# Patient Record
Sex: MALE | Race: WHITE | NOT HISPANIC OR LATINO | Employment: FULL TIME | URBAN - METROPOLITAN AREA
[De-identification: names, ages, dates, MRNs, and addresses within clinical notes are randomized per-mention and may not be internally consistent; named-entity substitution may affect disease eponyms.]

---

## 2021-05-06 ENCOUNTER — APPOINTMENT (OUTPATIENT)
Dept: LAB | Facility: HOSPITAL | Age: 41
End: 2021-05-06
Attending: INTERNAL MEDICINE
Payer: COMMERCIAL

## 2021-05-06 ENCOUNTER — OFFICE VISIT (OUTPATIENT)
Dept: INTERNAL MEDICINE CLINIC | Facility: CLINIC | Age: 41
End: 2021-05-06
Payer: COMMERCIAL

## 2021-05-06 VITALS
TEMPERATURE: 98 F | DIASTOLIC BLOOD PRESSURE: 82 MMHG | SYSTOLIC BLOOD PRESSURE: 120 MMHG | WEIGHT: 245 LBS | RESPIRATION RATE: 16 BRPM | HEART RATE: 78 BPM | HEIGHT: 75 IN | BODY MASS INDEX: 30.46 KG/M2 | OXYGEN SATURATION: 96 %

## 2021-05-06 DIAGNOSIS — Z13.6 ENCOUNTER FOR SCREENING FOR CARDIOVASCULAR DISORDERS: Primary | ICD-10-CM

## 2021-05-06 DIAGNOSIS — Z13.6 ENCOUNTER FOR SCREENING FOR CARDIOVASCULAR DISORDERS: ICD-10-CM

## 2021-05-06 DIAGNOSIS — J30.2 SEASONAL ALLERGIES: ICD-10-CM

## 2021-05-06 LAB
ALBUMIN SERPL BCP-MCNC: 4 G/DL (ref 3.5–5)
ALP SERPL-CCNC: 49 U/L (ref 46–116)
ALT SERPL W P-5'-P-CCNC: 51 U/L (ref 12–78)
ANION GAP SERPL CALCULATED.3IONS-SCNC: 7 MMOL/L (ref 4–13)
AST SERPL W P-5'-P-CCNC: 34 U/L (ref 5–45)
BASOPHILS # BLD AUTO: 0.04 THOUSANDS/ΜL (ref 0–0.1)
BASOPHILS NFR BLD AUTO: 1 % (ref 0–1)
BILIRUB SERPL-MCNC: 0.47 MG/DL (ref 0.2–1)
BUN SERPL-MCNC: 18 MG/DL (ref 5–25)
CALCIUM SERPL-MCNC: 8.5 MG/DL (ref 8.3–10.1)
CHLORIDE SERPL-SCNC: 104 MMOL/L (ref 100–108)
CHOLEST SERPL-MCNC: 170 MG/DL (ref 50–200)
CO2 SERPL-SCNC: 29 MMOL/L (ref 21–32)
CREAT SERPL-MCNC: 0.91 MG/DL (ref 0.6–1.3)
EOSINOPHIL # BLD AUTO: 0.19 THOUSAND/ΜL (ref 0–0.61)
EOSINOPHIL NFR BLD AUTO: 4 % (ref 0–6)
ERYTHROCYTE [DISTWIDTH] IN BLOOD BY AUTOMATED COUNT: 12.6 % (ref 11.6–15.1)
GFR SERPL CREATININE-BSD FRML MDRD: 107 ML/MIN/1.73SQ M
GLUCOSE P FAST SERPL-MCNC: 94 MG/DL (ref 65–99)
HCT VFR BLD AUTO: 48.6 % (ref 36.5–49.3)
HDLC SERPL-MCNC: 47 MG/DL
HGB BLD-MCNC: 15.3 G/DL (ref 12–17)
IMM GRANULOCYTES # BLD AUTO: 0.01 THOUSAND/UL (ref 0–0.2)
IMM GRANULOCYTES NFR BLD AUTO: 0 % (ref 0–2)
LDLC SERPL CALC-MCNC: 116 MG/DL (ref 0–100)
LYMPHOCYTES # BLD AUTO: 1.77 THOUSANDS/ΜL (ref 0.6–4.47)
LYMPHOCYTES NFR BLD AUTO: 35 % (ref 14–44)
MCH RBC QN AUTO: 27 PG (ref 26.8–34.3)
MCHC RBC AUTO-ENTMCNC: 31.5 G/DL (ref 31.4–37.4)
MCV RBC AUTO: 86 FL (ref 82–98)
MONOCYTES # BLD AUTO: 0.42 THOUSAND/ΜL (ref 0.17–1.22)
MONOCYTES NFR BLD AUTO: 8 % (ref 4–12)
NEUTROPHILS # BLD AUTO: 2.66 THOUSANDS/ΜL (ref 1.85–7.62)
NEUTS SEG NFR BLD AUTO: 52 % (ref 43–75)
NONHDLC SERPL-MCNC: 123 MG/DL
NRBC BLD AUTO-RTO: 0 /100 WBCS
PLATELET # BLD AUTO: 216 THOUSANDS/UL (ref 149–390)
PMV BLD AUTO: 10.3 FL (ref 8.9–12.7)
POTASSIUM SERPL-SCNC: 4.6 MMOL/L (ref 3.5–5.3)
PROT SERPL-MCNC: 7.1 G/DL (ref 6.4–8.2)
RBC # BLD AUTO: 5.66 MILLION/UL (ref 3.88–5.62)
SODIUM SERPL-SCNC: 140 MMOL/L (ref 136–145)
TRIGL SERPL-MCNC: 37 MG/DL
WBC # BLD AUTO: 5.09 THOUSAND/UL (ref 4.31–10.16)

## 2021-05-06 PROCEDURE — 3008F BODY MASS INDEX DOCD: CPT | Performed by: INTERNAL MEDICINE

## 2021-05-06 PROCEDURE — 80053 COMPREHEN METABOLIC PANEL: CPT

## 2021-05-06 PROCEDURE — 36415 COLL VENOUS BLD VENIPUNCTURE: CPT

## 2021-05-06 PROCEDURE — 99202 OFFICE O/P NEW SF 15 MIN: CPT | Performed by: INTERNAL MEDICINE

## 2021-05-06 PROCEDURE — 80061 LIPID PANEL: CPT

## 2021-05-06 PROCEDURE — 85025 COMPLETE CBC W/AUTO DIFF WBC: CPT

## 2021-05-06 PROCEDURE — 1036F TOBACCO NON-USER: CPT | Performed by: INTERNAL MEDICINE

## 2021-05-06 PROCEDURE — 3725F SCREEN DEPRESSION PERFORMED: CPT | Performed by: INTERNAL MEDICINE

## 2021-05-06 NOTE — PROGRESS NOTES
BMI Counseling: Body mass index is 30 62 kg/m²  The BMI is above normal  Nutrition recommendations include encouraging healthy choices of fruits and vegetables and limiting drinks that contain sugar  Exercise recommendations include moderate physical activity 150 minutes/week  No pharmacotherapy was ordered  Depression Screening and Follow-up Plan: Patient's depression screening was positive with a PHQ-2 score of 0  Clincally patient does not have depression  No treatment is required  Assessment/Plan:    Healthy 47year old male  F/u with me in 1 year and prn  Labs ordered  No problem-specific Assessment & Plan notes found for this encounter  Diagnoses and all orders for this visit:    Encounter for screening for cardiovascular disorders  -     CBC and differential; Future  -     Lipid panel; Future  -     Comprehensive metabolic panel; Future    Seasonal allergies          Subjective:      Patient ID: Evon Cobb is a 47 y o  male  Patient is a very pleasant 47year old male presenting to the clinic to establish care as a new patient  No significant PMH  He is  and has one son who is 16years old  He is a  and recently has his DOT physical done  He denies any complaints today  Denies any cp, sob, n/v/d/c, fever, chills, abdominal pain, bloody stools  He is active  And he is very disciplined with his diet  Does report seasonal allergies  Takes an allegra daily  Uses Flonase  The following portions of the patient's history were reviewed and updated as appropriate:   He  has no past medical history on file  He There are no active problems to display for this patient  He  has a past surgical history that includes Tonsillectomy and adenoidectomy  His family history includes Diabetes in his father; Heart disease in his father; Other in his brother  He  reports that he has never smoked  He has never used smokeless tobacco  He reports previous alcohol use   He reports that he does not use drugs  No current outpatient medications on file  No current facility-administered medications for this visit  No current outpatient medications on file prior to visit  No current facility-administered medications on file prior to visit  He has No Known Allergies       Review of Systems   Constitutional: Negative for fatigue, fever and unexpected weight change  HENT: Negative for tinnitus, trouble swallowing and voice change  Cardiovascular: Negative for chest pain, palpitations and leg swelling  Gastrointestinal: Negative for abdominal distention, abdominal pain, anal bleeding, blood in stool, constipation, diarrhea and vomiting  Genitourinary: Negative for decreased urine volume, scrotal swelling, testicular pain and urgency  Musculoskeletal: Negative for arthralgias, back pain and gait problem  Neurological: Negative for tremors, syncope, speech difficulty and weakness  Psychiatric/Behavioral: Negative for dysphoric mood and hallucinations  The patient is not nervous/anxious and is not hyperactive  All other systems reviewed and are negative  Objective:      /82 (BP Location: Left arm, Patient Position: Sitting, Cuff Size: Standard)   Pulse 78   Temp 98 °F (36 7 °C) (Tympanic)   Resp 16   Ht 6' 3" (1 905 m)   Wt 111 kg (245 lb)   SpO2 96%   BMI 30 62 kg/m²          Physical Exam  Vitals signs and nursing note reviewed  Constitutional:       Appearance: Normal appearance  He is overweight  HENT:      Head: Normocephalic and atraumatic  Right Ear: Tympanic membrane normal       Left Ear: Tympanic membrane normal       Nose: Nose normal    Neck:      Musculoskeletal: Normal range of motion and neck supple  Vascular: No carotid bruit  Cardiovascular:      Rate and Rhythm: Normal rate and regular rhythm  Heart sounds: Normal heart sounds     Pulmonary:      Effort: Pulmonary effort is normal       Breath sounds: Normal breath sounds  Abdominal:      General: Abdomen is flat  Bowel sounds are normal       Palpations: Abdomen is soft  Musculoskeletal: Normal range of motion  Right lower leg: No edema  Left lower leg: No edema  Lymphadenopathy:      Cervical: No cervical adenopathy  Skin:     General: Skin is warm and dry  Neurological:      General: No focal deficit present  Mental Status: He is alert and oriented to person, place, and time     Psychiatric:         Mood and Affect: Mood normal          Behavior: Behavior normal

## 2021-05-10 ENCOUNTER — TELEPHONE (OUTPATIENT)
Dept: INTERNAL MEDICINE CLINIC | Facility: CLINIC | Age: 41
End: 2021-05-10

## 2021-05-10 NOTE — TELEPHONE ENCOUNTER
----- Message from Phil Weber MD sent at 5/6/2021 12:40 PM EDT -----  Samuel's labs look great  His cholesterol is a little high, it is 117 and we like it under 100  Can become more active and try to better his diet  He does very well and avoids sugary products  Cut down on white foods like pasta and rice     ----- Message -----  From: Lab, Background User  Sent: 5/6/2021  10:38 AM EDT  To: Phil Weber MD

## 2021-11-04 ENCOUNTER — TELEMEDICINE (OUTPATIENT)
Dept: INTERNAL MEDICINE CLINIC | Facility: CLINIC | Age: 41
End: 2021-11-04
Payer: COMMERCIAL

## 2021-11-04 VITALS — BODY MASS INDEX: 30.46 KG/M2 | HEIGHT: 75 IN | WEIGHT: 245 LBS

## 2021-11-04 DIAGNOSIS — R68.83 CHILLS (WITHOUT FEVER): ICD-10-CM

## 2021-11-04 DIAGNOSIS — J06.9 VIRAL UPPER RESPIRATORY TRACT INFECTION: Primary | ICD-10-CM

## 2021-11-04 PROCEDURE — 99214 OFFICE O/P EST MOD 30 MIN: CPT | Performed by: INTERNAL MEDICINE

## 2021-11-04 PROCEDURE — U0003 INFECTIOUS AGENT DETECTION BY NUCLEIC ACID (DNA OR RNA); SEVERE ACUTE RESPIRATORY SYNDROME CORONAVIRUS 2 (SARS-COV-2) (CORONAVIRUS DISEASE [COVID-19]), AMPLIFIED PROBE TECHNIQUE, MAKING USE OF HIGH THROUGHPUT TECHNOLOGIES AS DESCRIBED BY CMS-2020-01-R: HCPCS | Performed by: INTERNAL MEDICINE

## 2021-11-04 PROCEDURE — U0005 INFEC AGEN DETEC AMPLI PROBE: HCPCS | Performed by: INTERNAL MEDICINE

## 2021-11-05 LAB — SARS-COV-2 RNA RESP QL NAA+PROBE: POSITIVE

## 2021-11-08 ENCOUNTER — TELEMEDICINE (OUTPATIENT)
Dept: INTERNAL MEDICINE CLINIC | Facility: CLINIC | Age: 41
End: 2021-11-08
Payer: COMMERCIAL

## 2021-11-08 VITALS — BODY MASS INDEX: 30.46 KG/M2 | WEIGHT: 245 LBS | HEIGHT: 75 IN

## 2021-11-08 DIAGNOSIS — U07.1 COVID-19: Primary | ICD-10-CM

## 2021-11-08 PROCEDURE — 3008F BODY MASS INDEX DOCD: CPT | Performed by: INTERNAL MEDICINE

## 2021-11-08 PROCEDURE — 3725F SCREEN DEPRESSION PERFORMED: CPT | Performed by: INTERNAL MEDICINE

## 2021-11-08 PROCEDURE — 1036F TOBACCO NON-USER: CPT | Performed by: INTERNAL MEDICINE

## 2021-11-08 PROCEDURE — 99214 OFFICE O/P EST MOD 30 MIN: CPT | Performed by: INTERNAL MEDICINE

## 2022-11-03 ENCOUNTER — APPOINTMENT (OUTPATIENT)
Dept: LAB | Facility: HOSPITAL | Age: 42
End: 2022-11-03

## 2022-11-03 ENCOUNTER — OFFICE VISIT (OUTPATIENT)
Dept: INTERNAL MEDICINE CLINIC | Facility: CLINIC | Age: 42
End: 2022-11-03

## 2022-11-03 VITALS
OXYGEN SATURATION: 99 % | HEART RATE: 72 BPM | BODY MASS INDEX: 32.95 KG/M2 | HEIGHT: 75 IN | WEIGHT: 265 LBS | TEMPERATURE: 98 F | SYSTOLIC BLOOD PRESSURE: 124 MMHG | DIASTOLIC BLOOD PRESSURE: 78 MMHG

## 2022-11-03 DIAGNOSIS — J30.2 SEASONAL ALLERGIES: ICD-10-CM

## 2022-11-03 DIAGNOSIS — Z00.00 ANNUAL PHYSICAL EXAM: Primary | ICD-10-CM

## 2022-11-03 DIAGNOSIS — Z13.220 ENCOUNTER FOR LIPID SCREENING FOR CARDIOVASCULAR DISEASE: ICD-10-CM

## 2022-11-03 DIAGNOSIS — Z11.59 ENCOUNTER FOR HEPATITIS C SCREENING TEST FOR LOW RISK PATIENT: ICD-10-CM

## 2022-11-03 DIAGNOSIS — Z82.49 FAMILY HISTORY OF CARDIAC DISORDER IN FATHER: ICD-10-CM

## 2022-11-03 DIAGNOSIS — Z11.4 SCREENING FOR HIV WITHOUT PRESENCE OF RISK FACTORS: ICD-10-CM

## 2022-11-03 DIAGNOSIS — Z13.6 ENCOUNTER FOR LIPID SCREENING FOR CARDIOVASCULAR DISEASE: ICD-10-CM

## 2022-11-03 LAB
ALBUMIN SERPL BCP-MCNC: 4.1 G/DL (ref 3.5–5)
ALP SERPL-CCNC: 48 U/L (ref 46–116)
ALT SERPL W P-5'-P-CCNC: 60 U/L (ref 12–78)
ANION GAP SERPL CALCULATED.3IONS-SCNC: 1 MMOL/L (ref 4–13)
AST SERPL W P-5'-P-CCNC: 39 U/L (ref 5–45)
BASOPHILS # BLD AUTO: 0.03 THOUSANDS/ÂΜL (ref 0–0.1)
BASOPHILS NFR BLD AUTO: 1 % (ref 0–1)
BILIRUB SERPL-MCNC: 0.56 MG/DL (ref 0.2–1)
BUN SERPL-MCNC: 22 MG/DL (ref 5–25)
CALCIUM SERPL-MCNC: 9.3 MG/DL (ref 8.3–10.1)
CHLORIDE SERPL-SCNC: 107 MMOL/L (ref 96–108)
CHOLEST SERPL-MCNC: 168 MG/DL
CO2 SERPL-SCNC: 31 MMOL/L (ref 21–32)
CREAT SERPL-MCNC: 0.9 MG/DL (ref 0.6–1.3)
EOSINOPHIL # BLD AUTO: 0.2 THOUSAND/ÂΜL (ref 0–0.61)
EOSINOPHIL NFR BLD AUTO: 4 % (ref 0–6)
ERYTHROCYTE [DISTWIDTH] IN BLOOD BY AUTOMATED COUNT: 12.9 % (ref 11.6–15.1)
EST. AVERAGE GLUCOSE BLD GHB EST-MCNC: 120 MG/DL
GFR SERPL CREATININE-BSD FRML MDRD: 106 ML/MIN/1.73SQ M
GLUCOSE P FAST SERPL-MCNC: 97 MG/DL (ref 65–99)
HBA1C MFR BLD: 5.8 %
HCT VFR BLD AUTO: 47.3 % (ref 36.5–49.3)
HCV AB SER QL: NORMAL
HDLC SERPL-MCNC: 48 MG/DL
HGB BLD-MCNC: 14.8 G/DL (ref 12–17)
IMM GRANULOCYTES # BLD AUTO: 0.01 THOUSAND/UL (ref 0–0.2)
IMM GRANULOCYTES NFR BLD AUTO: 0 % (ref 0–2)
LDLC SERPL CALC-MCNC: 109 MG/DL (ref 0–100)
LYMPHOCYTES # BLD AUTO: 1.81 THOUSANDS/ÂΜL (ref 0.6–4.47)
LYMPHOCYTES NFR BLD AUTO: 38 % (ref 14–44)
MCH RBC QN AUTO: 27.1 PG (ref 26.8–34.3)
MCHC RBC AUTO-ENTMCNC: 31.3 G/DL (ref 31.4–37.4)
MCV RBC AUTO: 87 FL (ref 82–98)
MONOCYTES # BLD AUTO: 0.49 THOUSAND/ÂΜL (ref 0.17–1.22)
MONOCYTES NFR BLD AUTO: 10 % (ref 4–12)
NEUTROPHILS # BLD AUTO: 2.18 THOUSANDS/ÂΜL (ref 1.85–7.62)
NEUTS SEG NFR BLD AUTO: 47 % (ref 43–75)
NRBC BLD AUTO-RTO: 0 /100 WBCS
PLATELET # BLD AUTO: 209 THOUSANDS/UL (ref 149–390)
PMV BLD AUTO: 10.4 FL (ref 8.9–12.7)
POTASSIUM SERPL-SCNC: 4.5 MMOL/L (ref 3.5–5.3)
PROT SERPL-MCNC: 7.1 G/DL (ref 6.4–8.4)
RBC # BLD AUTO: 5.47 MILLION/UL (ref 3.88–5.62)
SODIUM SERPL-SCNC: 139 MMOL/L (ref 135–147)
TRIGL SERPL-MCNC: 56 MG/DL
WBC # BLD AUTO: 4.72 THOUSAND/UL (ref 4.31–10.16)

## 2022-11-03 NOTE — PROGRESS NOTES
Assessment/Plan:     Patient is here for annual physical  General exam is good  He would like to get blood work done  Denies any major complaints  Is  and has a family  Discussed helathy drinking habits  Denies any drug use  There is a family h/o heart disease in his father who passed away at the age of 62  Follow up yearly and prn  Quality Measures:     BMI Counseling: Body mass index is 33 12 kg/m²  The BMI is above normal  Nutrition recommendations include decreasing portion sizes and encouraging healthy choices of fruits and vegetables  Exercise recommendations include moderate physical activity 150 minutes/week  Rationale for BMI follow-up plan is due to patient being overweight or obese  Depression Screening and Follow-up Plan: Patient was screened for depression during today's encounter  They screened negative with a PHQ-2 score of 0  Return in about 1 year (around 11/3/2023)  No problem-specific Assessment & Plan notes found for this encounter  Diagnoses and all orders for this visit:    Annual physical exam    Seasonal allergies  -     CBC and differential; Future  -     Comprehensive metabolic panel; Future    Encounter for hepatitis C screening test for low risk patient  -     Hepatitis C antibody; Future    Screening for HIV without presence of risk factors  -     HIV 1/2 Antigen/Antibody (4th Generation) w Reflex [facility]; Future    Family history of cardiac disorder in father  -     CBC and differential; Future  -     Comprehensive metabolic panel; Future  -     Lipid Panel with Direct LDL reflex; Future  -     Hemoglobin A1C; Future    Encounter for lipid screening for cardiovascular disease  -     Lipid Panel with Direct LDL reflex; Future  -     Hemoglobin A1C; Future          Subjective:      Patient ID: Bird Bland is a 38 y o  male      Here for physical       ALLERGIES:  No Known Allergies    CURRENT MEDICATIONS:  No current outpatient medications on file     ACTIVE PROBLEM LIST:  Patient Active Problem List   Diagnosis   • Annual physical exam   • Seasonal allergies       PAST MEDICAL HISTORY:  History reviewed  No pertinent past medical history  PAST SURGICAL HISTORY:  Past Surgical History:   Procedure Laterality Date   • TONSILLECTOMY AND ADENOIDECTOMY         FAMILY HISTORY:  Family History   Problem Relation Age of Onset   • Heart disease Father    • Diabetes Father    • Other Brother         history of non-Hodgkins lymphoma       SOCIAL HISTORY:  Social History     Socioeconomic History   • Marital status: /Civil Union     Spouse name: Not on file   • Number of children: Not on file   • Years of education: 12   • Highest education level: Not on file   Occupational History   • Occupation:    Tobacco Use   • Smoking status: Never Smoker   • Smokeless tobacco: Never Used   Substance and Sexual Activity   • Alcohol use: Not Currently   • Drug use: Never   • Sexual activity: Yes   Other Topics Concern   • Not on file   Social History Narrative    · Do you currently or have you served in the [email or web address]:   No      · Were you activated, into active duty, as a member of the [employer] or as a Reservist:   No      · Exercise level: Moderate      · Diet:   Regular      · General stress level:   Low      · Caffeine intake: Moderate      · Guns present in home:   No      · Seat belts used routinely:   Yes      · Sunscreen used routinely:   Yes      · Smoke alarm in home:    Yes      · Advance directive:   No      · Salt Intake:   occasional      · Has the Patient had a mammogram to screen for breast cancer within 24 months:   No      · Would the patient like to schedule a Mammogram:   No      · Is the patient interested in a colorectal cancer screening:   No      ·      Social Determinants of Health     Financial Resource Strain: Not on file   Food Insecurity: Not on file   Transportation Needs: Not on file   Physical Activity: Not on file   Stress: Not on file   Social Connections: Not on file   Intimate Partner Violence: Not on file   Housing Stability: Not on file       Review of Systems   All other systems reviewed and are negative  Objective:  Vitals:    11/03/22 0944   BP: 124/78   BP Location: Left arm   Patient Position: Sitting   Cuff Size: Adult   Pulse: 72   Temp: 98 °F (36 7 °C)   TempSrc: Tympanic   SpO2: 99%   Weight: 120 kg (265 lb)   Height: 6' 3" (1 905 m)     Body mass index is 33 12 kg/m²  Physical Exam  Vitals and nursing note reviewed  Constitutional:       Appearance: Normal appearance  HENT:      Head: Normocephalic and atraumatic  Right Ear: Tympanic membrane normal       Left Ear: Tympanic membrane normal    Cardiovascular:      Rate and Rhythm: Normal rate and regular rhythm  Heart sounds: Normal heart sounds  Pulmonary:      Breath sounds: Normal breath sounds  Musculoskeletal:      Cervical back: Normal range of motion  Lymphadenopathy:      Cervical: No cervical adenopathy  Neurological:      Mental Status: He is alert  RESULTS:    No results found for this or any previous visit (from the past 1008 hour(s))  This note was created with voice recognition software  Phonic, grammatical and spelling errors may be present within the note as a result

## 2022-11-03 NOTE — PROGRESS NOTES
Assessment/Plan:      Quality Measures:     BMI Counseling: There is no height or weight on file to calculate BMI  The BMI is above normal  Nutrition recommendations include decreasing portion sizes and encouraging healthy choices of fruits and vegetables  Exercise recommendations include moderate physical activity 150 minutes/week  Rationale for BMI follow-up plan is due to patient being overweight or obese  Depression Screening and Follow-up Plan: Clincally patient does not have depression  No treatment is required  No follow-ups on file  No problem-specific Assessment & Plan notes found for this encounter  There are no diagnoses linked to this encounter  Subjective:      Patient ID: Gi Wilson is a 38 y o  male  Here for routine visit  ALLERGIES:  No Known Allergies    CURRENT MEDICATIONS:  No current outpatient medications on file  ACTIVE PROBLEM LIST:  There is no problem list on file for this patient  PAST MEDICAL HISTORY:  No past medical history on file      PAST SURGICAL HISTORY:  Past Surgical History:   Procedure Laterality Date   • TONSILLECTOMY AND ADENOIDECTOMY         FAMILY HISTORY:  Family History   Problem Relation Age of Onset   • Heart disease Father    • Diabetes Father    • Other Brother         history of non-Hodgkins lymphoma       SOCIAL HISTORY:  Social History     Socioeconomic History   • Marital status: /Civil Union     Spouse name: Not on file   • Number of children: Not on file   • Years of education: 12   • Highest education level: Not on file   Occupational History   • Occupation:    Tobacco Use   • Smoking status: Never Smoker   • Smokeless tobacco: Never Used   Substance and Sexual Activity   • Alcohol use: Not Currently   • Drug use: Never   • Sexual activity: Yes   Other Topics Concern   • Not on file   Social History Narrative    · Do you currently or have you served in the [employer]:   No      · Were you activated, into active duty, as a member of the [employer] or as a Reservist:   No      · Exercise level: Moderate      · Diet:   Regular      · General stress level:   Low      · Caffeine intake: Moderate      · Guns present in home:   No      · Seat belts used routinely:   Yes      · Sunscreen used routinely:   Yes      · Smoke alarm in home: Yes      · Advance directive:   No      · Salt Intake:   occasional      · Has the Patient had a mammogram to screen for breast cancer within 24 months:   No      · Would the patient like to schedule a Mammogram:   No      · Is the patient interested in a colorectal cancer screening:   No      ·      Social Determinants of Health     Financial Resource Strain: Not on file   Food Insecurity: Not on file   Transportation Needs: Not on file   Physical Activity: Not on file   Stress: Not on file   Social Connections: Not on file   Intimate Partner Violence: Not on file   Housing Stability: Not on file       Review of Systems   All other systems reviewed and are negative  Objective: There were no vitals filed for this visit  There is no height or weight on file to calculate BMI  Physical Exam  Vitals and nursing note reviewed  Constitutional:       Appearance: Normal appearance  HENT:      Head: Normocephalic and atraumatic  Cardiovascular:      Rate and Rhythm: Normal rate and regular rhythm  Heart sounds: Normal heart sounds  Pulmonary:      Effort: Pulmonary effort is normal       Breath sounds: Normal breath sounds  Musculoskeletal:         General: Normal range of motion  Cervical back: Normal range of motion  Right lower leg: No edema  Left lower leg: No edema  Skin:     General: Skin is warm and dry  Neurological:      General: No focal deficit present  Mental Status: He is alert and oriented to person, place, and time  Mental status is at baseline     Psychiatric:         Mood and Affect: Mood normal  RESULTS:    No results found for this or any previous visit (from the past 1008 hour(s))  This note was created with voice recognition software  Phonic, grammatical and spelling errors may be present within the note as a result

## 2022-11-03 NOTE — PROGRESS NOTES
[address]    NAME: Ting Brito  AGE: 38 y o  SEX: male  : 1980     DATE: 11/3/2022     Assessment and Plan:     Problem List Items Addressed This Visit    None     Visit Diagnoses     Seasonal allergies    -  Primary    Relevant Orders    CBC and differential    Comprehensive metabolic panel    Encounter for hepatitis C screening test for low risk patient        Relevant Orders    Hepatitis C antibody    Screening for HIV without presence of risk factors        Relevant Orders    HIV 1/2 Antigen/Antibody (4th Generation) w Reflex [facility]    Family history of cardiac disorder in father        Relevant Orders    CBC and differential    Comprehensive metabolic panel    Lipid Panel with Direct LDL reflex    Hemoglobin A1C    Annual physical exam        Encounter for lipid screening for cardiovascular disease        Relevant Orders    Lipid Panel with Direct LDL reflex    Hemoglobin A1C          Immunizations and preventive care screenings were discussed with patient today  Appropriate education was printed on patient's after visit summary  Counseling:  Alcohol/drug use: discussed moderation in alcohol intake, the recommendations for healthy alcohol use, and avoidance of illicit drug use  Dental Health: discussed importance of regular tooth brushing, flossing, and dental visits  Injury prevention: discussed safety/seat belts, safety helmets, smoke detectors, carbon dioxide detectors, and smoking near bedding or upholstery  Sexual health: discussed sexually transmitted diseases, partner selection, use of condoms, avoidance of unintended pregnancy, and contraceptive alternatives  · Exercise: the importance of regular exercise/physical activity was discussed  Recommend exercise 3-5 times per week for at least 30 minutes  BMI Counseling: Body mass index is 33 12 kg/m²   The BMI is above normal  Nutrition recommendations include decreasing portion sizes and encouraging healthy choices of fruits and vegetables  Exercise recommendations include moderate physical activity 150 minutes/week  Rationale for BMI follow-up plan is due to patient being overweight or obese  Depression Screening and Follow-up Plan: Patient was screened for depression during today's encounter  They screened negative with a PHQ-2 score of 0  No follow-ups on file  Chief Complaint:     Chief Complaint   Patient presents with   • Physical Exam      History of Present Illness:     Adult Annual Physical   Patient here for a comprehensive physical exam  The patient reports no problems  Diet and Physical Activity  · Diet/Nutrition: well balanced diet  · Exercise: no formal exercise  Depression Screening  PHQ-2/9 Depression Screening    Little interest or pleasure in doing things: 0 - not at all  Feeling down, depressed, or hopeless: 0 - not at all  PHQ-2 Score: 0  PHQ-2 Interpretation: Negative depression screen       General Health  · Sleep: sleeps well  · Hearing: normal - bilateral   · Vision: wears glasses  · Dental: regular dental visits   Health  · Symptoms include: none     Review of Systems:     Review of Systems   All other systems reviewed and are negative  Past Medical History:     History reviewed  No pertinent past medical history     Past Surgical History:     Past Surgical History:   Procedure Laterality Date   • TONSILLECTOMY AND ADENOIDECTOMY        Family History:     Family History   Problem Relation Age of Onset   • Heart disease Father    • Diabetes Father    • Other Brother         history of non-Hodgkins lymphoma      Social History:     Social History     Socioeconomic History   • Marital status: /Civil Union     Spouse name: None   • Number of children: None   • Years of education: 12   • Highest education level: None   Occupational History   • Occupation:    Tobacco Use • Smoking status: Never Smoker   • Smokeless tobacco: Never Used   Substance and Sexual Activity   • Alcohol use: Not Currently   • Drug use: Never   • Sexual activity: Yes   Other Topics Concern   • None   Social History Narrative    · Do you currently or have you served in the [employer]:   No      · Were you activated, into active duty, as a member of the [employer] or as a Reservist:   No      · Exercise level: Moderate      · Diet:   Regular      · General stress level:   Low      · Caffeine intake: Moderate      · Guns present in home:   No      · Seat belts used routinely:   Yes      · Sunscreen used routinely:   Yes      · Smoke alarm in home: Yes      · Advance directive:   No      · Salt Intake:   occasional      · Has the Patient had a mammogram to screen for breast cancer within 24 months:   No      · Would the patient like to schedule a Mammogram:   No      · Is the patient interested in a colorectal cancer screening:   No      ·      Social Determinants of Health     Financial Resource Strain: Not on file   Food Insecurity: Not on file   Transportation Needs: Not on file   Physical Activity: Not on file   Stress: Not on file   Social Connections: Not on file   Intimate Partner Violence: Not on file   Housing Stability: Not on file      Current Medications:     No current outpatient medications on file  No current facility-administered medications for this visit  Allergies:     No Known Allergies   Physical Exam:     /78 (BP Location: Left arm, Patient Position: Sitting, Cuff Size: Adult)   Pulse 72   Temp 98 °F (36 7 °C) (Tympanic)   Ht 6' 3" (1 905 m)   Wt 120 kg (265 lb)   SpO2 99%   BMI 33 12 kg/m²     Physical Exam  Vitals and nursing note reviewed  Constitutional:       Appearance: Normal appearance  HENT:      Head: Normocephalic and atraumatic  Cardiovascular:      Rate and Rhythm: Normal rate and regular rhythm  Heart sounds: Normal heart sounds  Pulmonary:      Effort: Pulmonary effort is normal       Breath sounds: Normal breath sounds  Musculoskeletal:         General: Normal range of motion  Cervical back: Normal range of motion  Right lower leg: No edema  Left lower leg: No edema  Skin:     General: Skin is warm and dry  Neurological:      General: No focal deficit present  Mental Status: He is alert and oriented to person, place, and time  Mental status is at baseline     Psychiatric:         Mood and Affect: Mood normal           Leon Olivera MD  [facility]

## 2022-11-04 LAB — HIV 1+2 AB+HIV1 P24 AG SERPL QL IA: NORMAL

## 2023-03-11 ENCOUNTER — APPOINTMENT (EMERGENCY)
Dept: VASCULAR ULTRASOUND | Facility: HOSPITAL | Age: 43
End: 2023-03-11

## 2023-03-11 ENCOUNTER — HOSPITAL ENCOUNTER (EMERGENCY)
Facility: HOSPITAL | Age: 43
Discharge: HOME | End: 2023-03-11
Attending: EMERGENCY MEDICINE | Admitting: EMERGENCY MEDICINE

## 2023-03-11 VITALS
OXYGEN SATURATION: 100 % | TEMPERATURE: 98.3 F | WEIGHT: 265 LBS | RESPIRATION RATE: 17 BRPM | HEART RATE: 83 BPM | BODY MASS INDEX: 32.95 KG/M2 | SYSTOLIC BLOOD PRESSURE: 139 MMHG | DIASTOLIC BLOOD PRESSURE: 90 MMHG | HEIGHT: 75 IN

## 2023-03-11 DIAGNOSIS — M79.604 RIGHT LEG PAIN: Primary | ICD-10-CM

## 2023-03-11 NOTE — ED PROVIDER NOTES
History  Chief Complaint   Patient presents with   • Leg Pain     Pt started with right leg numbness that started today but has resolved but is now feeling tight  Pt is a   No hx of blood clots  Intermittent R medial leg tightness since earlier today  Initially described as a numbness but on further questioning he notes it was more of a sensation of tightness than a lack of sensation  No trauma but pt works as a   No leg swelling or cough or sob or syncope or near syncope  No h/o vte  No leg weakness  None       History reviewed  No pertinent past medical history  Past Surgical History:   Procedure Laterality Date   • TONSILLECTOMY AND ADENOIDECTOMY         Family History   Problem Relation Age of Onset   • Heart disease Father    • Diabetes Father    • Other Brother         history of non-Hodgkins lymphoma     I have reviewed and agree with the history as documented  E-Cigarette/Vaping     E-Cigarette/Vaping Substances     Social History     Tobacco Use   • Smoking status: Never   • Smokeless tobacco: Never   Substance Use Topics   • Alcohol use: Not Currently   • Drug use: Never       Review of Systems   Cardiovascular: Negative for leg swelling  Neurological: Positive for numbness  Negative for weakness  Physical Exam  Physical Exam  Vitals and nursing note reviewed  Constitutional:       General: He is not in acute distress  Appearance: Normal appearance  He is well-developed  He is not ill-appearing, toxic-appearing or diaphoretic  HENT:      Head: Normocephalic and atraumatic  Eyes:      General:         Right eye: No discharge  Left eye: No discharge  Conjunctiva/sclera: Conjunctivae normal       Pupils: Pupils are equal, round, and reactive to light  Neck:      Vascular: No JVD  Pulmonary:      Effort: Pulmonary effort is normal  No respiratory distress  Breath sounds: No stridor     Musculoskeletal:         General: No swelling, tenderness, deformity or signs of injury  Normal range of motion  Cervical back: Normal range of motion and neck supple  Left lower leg: No edema  Skin:     General: Skin is warm and dry  Capillary Refill: Capillary refill takes less than 2 seconds  Coloration: Skin is not pale  Findings: No erythema or rash  Neurological:      General: No focal deficit present  Mental Status: He is alert and oriented to person, place, and time  Cranial Nerves: No cranial nerve deficit  Sensory: No sensory deficit  Motor: No weakness or abnormal muscle tone  Coordination: Coordination normal          Vital Signs  ED Triage Vitals [03/11/23 1535]   Temperature Pulse Respirations Blood Pressure SpO2   98 3 °F (36 8 °C) 83 17 139/90 100 %      Temp Source Heart Rate Source Patient Position - Orthostatic VS BP Location FiO2 (%)   Oral Monitor Sitting Left arm --      Pain Score       No Pain           Vitals:    03/11/23 1535   BP: 139/90   Pulse: 83   Patient Position - Orthostatic VS: Sitting         Visual Acuity      ED Medications  Medications - No data to display    Diagnostic Studies  Results Reviewed     None                 VAS lower limb venous duplex study, unilateral/limited    (Results Pending)              Procedures  Procedures         ED Course                                             Medical Decision Making  Right leg pain: acute illness or injury     Details: ddx includes dvt  duplex without evidence of DVT  prelim report from u/s tech suggestive of hypoechoic structure in popliteal fossa, i discussed this with pt and his wife and recommend f/u with orthopedics for further evaluation  Amount and/or Complexity of Data Reviewed  ECG/medicine tests: ordered            Disposition  Final diagnoses:   Right leg pain     Time reflects when diagnosis was documented in both MDM as applicable and the Disposition within this note     Time User Action Codes Description Comment    3/11/2023  5:04 PM Aleahzoltan HanDarin Add [J70 [id number]] Right leg pain       ED Disposition     ED Disposition   Discharge    Condition   Stable    Date/Time   Sat Mar 11, 2023  5:04 PM    Comment   [facility] discharge to home/self care  Follow-up Information     Follow up With Specialties Details Why [address]  [address] [place] [facility]  [phone]          There are no discharge medications for this patient  No discharge procedures on file      PDMP Review     None          ED Provider  Electronically Signed by           Zuly Yu MD  03/11/23 2913

## 2023-03-31 ENCOUNTER — OFFICE VISIT (OUTPATIENT)
Dept: FAMILY MEDICINE CLINIC | Facility: CLINIC | Age: 43
End: 2023-03-31

## 2023-03-31 VITALS
HEIGHT: 75 IN | HEART RATE: 67 BPM | TEMPERATURE: 97.8 F | BODY MASS INDEX: 34.14 KG/M2 | WEIGHT: 274.6 LBS | DIASTOLIC BLOOD PRESSURE: 84 MMHG | SYSTOLIC BLOOD PRESSURE: 130 MMHG | OXYGEN SATURATION: 95 %

## 2023-03-31 DIAGNOSIS — M71.21 BAKER'S CYST OF KNEE, RIGHT: Primary | ICD-10-CM

## 2023-03-31 DIAGNOSIS — Z13.1 SCREENING FOR DIABETES MELLITUS: ICD-10-CM

## 2023-03-31 DIAGNOSIS — Z76.89 ENCOUNTER TO ESTABLISH CARE: ICD-10-CM

## 2023-03-31 DIAGNOSIS — Z13.220 SCREENING FOR LIPID DISORDERS: ICD-10-CM

## 2023-03-31 NOTE — PROGRESS NOTES
Name: Abbi Baird      : 1980      MRN: [id number]  Encounter Provider: DILAN Lynn  Encounter Date: 3/31/2023   Encounter department: [address] [place], [place] [address]     1  Baker's cyst of knee, right  Assessment & Plan:  Patient has a Bakers cyst and has no present complaints       2  Encounter to establish care    3  Screening for lipid disorders  -     Lipid Panel with Direct LDL reflex; Future    4  Screening for diabetes mellitus  -     Comprehensive metabolic panel; Future      BMI Counseling: Body mass index is 34 32 kg/m²  The BMI is above normal  Nutrition recommendations include decreasing portion sizes, encouraging healthy choices of fruits and vegetables, decreasing fast food intake, consuming healthier snacks, limiting drinks that contain sugar, moderation in carbohydrate intake, increasing intake of lean protein, reducing intake of saturated and trans fat and reducing intake of cholesterol  Exercise recommendations include moderate physical activity 150 minutes/week  Patient referred to PCP  Rationale for BMI follow-up plan is due to patient being overweight or obese  Subjective      Patient here today to establish care  Patient had been to the ED and reports that he is a  and was doing runs out to [employer] [place] and drove 800 miles in a few days and he started having lots of pain in the right leg and reports that he went to get checked out and concerns for having a blood clot and went to the hospital and DVT was ruled out  He was told that he had a Bakers Cyst in the right knee  Patient reports that cardiac and diabetes runs in the family and brother passed away from [address] [place] [address] at age 15  Patient has a history of taking mental health medications  Patient was told that he has ADHD and questionable BPD       Review of Systems   Constitutional: Negative for activity change, appetite change, chills, diaphoresis, fatigue, fever and unexpected "weight change  HENT: Negative for congestion, ear pain, hearing loss, postnasal drip, sinus pressure, sinus pain, sneezing and sore throat  Eyes: Negative for pain, redness and visual disturbance  Respiratory: Negative for cough and shortness of breath  Cardiovascular: Negative for chest pain and leg swelling  Gastrointestinal: Negative for abdominal pain, diarrhea, nausea and vomiting  Endocrine: Negative  Genitourinary: Negative  Musculoskeletal: Positive for arthralgias  Skin: Negative  Allergic/Immunologic: Negative  Neurological: Negative for dizziness and light-headedness  Hematological: Negative  Psychiatric/Behavioral: Negative for behavioral problems, dysphoric mood and sleep disturbance  No current outpatient medications on file prior to visit  Objective     /84 (BP Location: Left arm, Patient Position: Sitting)   Pulse 67   Temp 97 8 °F (36 6 °C) (Temporal)   Ht 6' 3\" (1 905 m)   Wt 125 kg (274 lb 9 6 oz)   SpO2 95%   BMI 34 32 kg/m²     Physical Exam  Vitals and nursing note reviewed  Constitutional:       General: He is not in acute distress  Appearance: He is well-developed  HENT:      Head: Normocephalic and atraumatic  Right Ear: Tympanic membrane normal       Left Ear: Tympanic membrane normal       Nose: Nose normal       Mouth/Throat:      Mouth: Mucous membranes are moist    Eyes:      Pupils: Pupils are equal, round, and reactive to light  Neck:      Thyroid: No thyromegaly  Cardiovascular:      Rate and Rhythm: Normal rate and regular rhythm  Heart sounds: Normal heart sounds  No murmur heard  Pulmonary:      Effort: Pulmonary effort is normal  No respiratory distress  Breath sounds: Normal breath sounds  No wheezing  Abdominal:      General: Bowel sounds are normal       Palpations: Abdomen is soft  Musculoskeletal:         General: Normal range of motion  Cervical back: Normal range of motion   " Skin:     General: Skin is warm and dry  Neurological:      General: No focal deficit present  Mental Status: He is alert and oriented to person, place, and time     Psychiatric:         Mood and Affect: Mood normal          Behavior: Behavior normal        DILAN Harley

## 2023-05-30 PROBLEM — Z13.1 SCREENING FOR DIABETES MELLITUS: Status: RESOLVED | Noted: 2023-03-31 | Resolved: 2023-05-30

## 2023-05-30 PROBLEM — Z13.220 SCREENING FOR LIPID DISORDERS: Status: RESOLVED | Noted: 2023-03-31 | Resolved: 2023-05-30

## 2023-08-15 ENCOUNTER — OFFICE VISIT (OUTPATIENT)
Dept: URGENT CARE | Facility: CLINIC | Age: 43
End: 2023-08-15
Payer: COMMERCIAL

## 2023-08-15 VITALS
DIASTOLIC BLOOD PRESSURE: 87 MMHG | TEMPERATURE: 97.4 F | SYSTOLIC BLOOD PRESSURE: 158 MMHG | RESPIRATION RATE: 18 BRPM | HEART RATE: 90 BPM | OXYGEN SATURATION: 96 %

## 2023-08-15 DIAGNOSIS — B34.9 VIRAL SYNDROME: Primary | ICD-10-CM

## 2023-08-15 DIAGNOSIS — J01.00 ACUTE NON-RECURRENT MAXILLARY SINUSITIS: ICD-10-CM

## 2023-08-15 PROCEDURE — 99213 OFFICE O/P EST LOW 20 MIN: CPT | Performed by: PHYSICIAN ASSISTANT

## 2023-08-15 RX ORDER — BROMPHENIRAMINE MALEATE, PSEUDOEPHEDRINE HYDROCHLORIDE, AND DEXTROMETHORPHAN HYDROBROMIDE 2; 30; 10 MG/5ML; MG/5ML; MG/5ML
10 SYRUP ORAL
Qty: 120 ML | Refills: 0 | Status: SHIPPED | OUTPATIENT
Start: 2023-08-15

## 2023-08-15 RX ORDER — PREDNISONE 10 MG/1
30 TABLET ORAL DAILY
Qty: 9 TABLET | Refills: 0 | Status: SHIPPED | OUTPATIENT
Start: 2023-08-15 | End: 2023-08-18

## 2023-08-15 RX ORDER — BENZONATATE 100 MG/1
100 CAPSULE ORAL 3 TIMES DAILY PRN
Qty: 20 CAPSULE | Refills: 0 | Status: SHIPPED | OUTPATIENT
Start: 2023-08-15

## 2023-08-15 NOTE — PROGRESS NOTES
[facility]        NAME: Aung Melendez is a 41 y.o. male  : 1980    MRN: [id number]  DATE: August 15, 2023  TIME: 7:23 PM    Assessment and Plan   Viral syndrome [B34.9]  1. Viral syndrome        2. Acute non-recurrent maxillary sinusitis  predniSONE 10 mg tablet    benzonatate (TESSALON PERLES) 100 mg capsule    brompheniramine-pseudoephedrine-DM 30-2-10 MG/5ML syrup            Patient Instructions   Patient Instructions   A sinus infection is most often caused by a virus. Treatment for a sinus infection focuses on symptomatic management as it typically resolves within 7 to 10 days. There are no treatments to shorten the clinical course of the disease. Patients with a viral sinus infection should be managed with supportive care only. Bacterial infection occurs in only 0.5 to 2 percent of episodes of sinus infections. Acute bacterial sinus infections may also be a self-limited disease and resolve without antibiotics. However, if your symptoms do last past 7 days you may call the office back and I will send in an antibiotic for you. According to the  [facility] [employer] of Otolaryngology- Head and Neck Surgery patients with a sinus infection should have a seven day waiting period with symptomatic management. Upon day seven if there is no improvement an antibiotic should then be initiated.          Symptomatic management:     - Nasal congestion: Nasal irrigation with nasal saline or intranasal glucocorticoids (FLONASE)           Short course of oral decongestants (Sudafed) 3-5 days           (Guaifenesin) may help thin secretions and may promote ease of mucus drainage and clearance           Inhalation of warm, humidified air (steam), may provide patients with a transient sense of relief of congestion                - Pain and Fever: Over-the-counter analgesics and antipyretics such as nonsteroidal antiinflammatory drugs (NSAIDs) and acetaminophen may be used                Follow up with PCP in 3-5 days. Proceed to  ER if symptoms worsen. Chief Complaint     Chief Complaint   Patient presents with   • Cold Like Symptoms     Cough, sinus pressure, sore throat with cough, taking sudafed and robitussin. States sputum is yellow is color. History of Present Illness       Pt reports a productive cough, sore throat, and sinus pain/pressure x 1 day. Reports coughing up yellow mucus. Has tried sudafed and robitussin. Reports going on vacation tomorrow in [place]. He is cocnered that he will be sick while there. Review of Systems   Review of Systems   Constitutional: Negative for activity change, appetite change, chills, diaphoresis and fever. HENT: Positive for sinus pressure and sinus pain. Negative for congestion, ear pain, rhinorrhea and sore throat. Eyes: Negative for pain and visual disturbance. Respiratory: Positive for cough. Negative for chest tightness and shortness of breath. Cardiovascular: Negative for chest pain and palpitations. Gastrointestinal: Negative for abdominal pain, diarrhea, nausea and vomiting. Genitourinary: Negative for dysuria and hematuria. Musculoskeletal: Negative for arthralgias, back pain and myalgias. Skin: Negative for color change, pallor and rash. Neurological: Negative for seizures, syncope and headaches. All other systems reviewed and are negative.         Current Medications       Current Outpatient Medications:   •  benzonatate (TESSALON PERLES) 100 mg capsule, Take 1 capsule (100 mg total) by mouth 3 (three) times a day as needed for cough, Disp: 20 capsule, Rfl: 0  •  brompheniramine-pseudoephedrine-DM 30-2-10 MG/5ML syrup, Take 10 mL by mouth daily at bedtime, Disp: 120 mL, Rfl: 0  •  fexofenadine (ALLEGRA) 180 MG tablet, Take 180 mg by mouth daily, Disp: , Rfl:   •  predniSONE 10 mg tablet, Take 3 tablets (30 mg total) by mouth daily for 3 days, Disp: 9 tablet, Rfl: 0    Current Allergies     Allergies as of 08/15/2023   • (No Known Allergies)            The following portions of the patient's history were reviewed and updated as appropriate: allergies, current medications, past family history, past medical history, past social history, past surgical history and problem list.     History reviewed. No pertinent past medical history. Past Surgical History:   Procedure Laterality Date   • TONSILLECTOMY AND ADENOIDECTOMY         Family History   Problem Relation Age of Onset   • Heart disease Father    • Diabetes Father    • Other Brother         history of non-Hodgkins lymphoma         Medications have been verified. Objective   /87   Pulse 90   Temp (!) 97.4 °F (36.3 °C)   Resp 18   SpO2 96%        Physical Exam     Physical Exam  Vitals and nursing note reviewed. Constitutional:       General: He is not in acute distress. Appearance: Normal appearance. He is normal weight. He is not ill-appearing, toxic-appearing or diaphoretic. HENT:      Head: Normocephalic and atraumatic. Right Ear: Tympanic membrane, ear canal and external ear normal. There is no impacted cerumen. Left Ear: Tympanic membrane, ear canal and external ear normal. There is no impacted cerumen. Nose: Nose normal. No congestion or rhinorrhea. Mouth/Throat:      Mouth: Mucous membranes are moist.      Pharynx: Oropharynx is clear. No oropharyngeal exudate or posterior oropharyngeal erythema. Eyes:      Extraocular Movements: Extraocular movements intact. Conjunctiva/sclera: Conjunctivae normal.      Pupils: Pupils are equal, round, and reactive to light. Cardiovascular:      Rate and Rhythm: Normal rate and regular rhythm. Heart sounds: Normal heart sounds. No murmur heard. No friction rub. No gallop. Pulmonary:      Effort: Pulmonary effort is normal. No respiratory distress. Breath sounds: Normal breath sounds. No stridor. No wheezing, rhonchi or rales. Chest:      Chest wall: No tenderness.    Abdominal: General: Abdomen is flat. Bowel sounds are normal. There is no distension. Palpations: Abdomen is soft. Tenderness: There is no abdominal tenderness. There is no guarding. Musculoskeletal:         General: Normal range of motion. Cervical back: Normal range of motion. Lymphadenopathy:      Cervical: No cervical adenopathy. Skin:     General: Skin is warm and dry. Capillary Refill: Capillary refill takes less than 2 seconds. Neurological:      Mental Status: He is alert.

## 2023-08-15 NOTE — PATIENT INSTRUCTIONS
A sinus infection is most often caused by a virus. Treatment for a sinus infection focuses on symptomatic management as it typically resolves within 7 to 10 days. There are no treatments to shorten the clinical course of the disease. Patients with a viral sinus infection should be managed with supportive care only. Bacterial infection occurs in only 0.5 to 2 percent of episodes of sinus infections. Acute bacterial sinus infections may also be a self-limited disease and resolve without antibiotics. However, if your symptoms do last past 7 days you may call the office back and I will send in an antibiotic for you. According to the [facility] of Otolaryngology- Head and Neck Surgery patients with a sinus infection should have a seven day waiting period with symptomatic management. Upon day seven if there is no improvement an antibiotic should then be initiated.          Symptomatic management:     - Nasal congestion: Nasal irrigation with nasal saline or intranasal glucocorticoids (FLONASE)           Short course of oral decongestants (Sudafed) 3-5 days           (Guaifenesin) may help thin secretions and may promote ease of mucus drainage and clearance           Inhalation of warm, humidified air (steam), may provide patients with a transient sense of relief of congestion                - Pain and Fever: Over-the-counter analgesics and antipyretics such as nonsteroidal antiinflammatory drugs (NSAIDs) and acetaminophen may be used

## 2023-11-07 ENCOUNTER — APPOINTMENT (EMERGENCY)
Dept: RADIOLOGY | Facility: HOSPITAL | Age: 43
End: 2023-11-07
Payer: COMMERCIAL

## 2023-11-07 ENCOUNTER — HOSPITAL ENCOUNTER (EMERGENCY)
Facility: HOSPITAL | Age: 43
Discharge: HOME | End: 2023-11-07
Attending: EMERGENCY MEDICINE
Payer: COMMERCIAL

## 2023-11-07 VITALS
DIASTOLIC BLOOD PRESSURE: 82 MMHG | RESPIRATION RATE: 20 BRPM | TEMPERATURE: 98.9 F | SYSTOLIC BLOOD PRESSURE: 140 MMHG | OXYGEN SATURATION: 98 % | HEART RATE: 86 BPM

## 2023-11-07 DIAGNOSIS — R20.0 NUMBNESS AND TINGLING OF RIGHT HAND: Primary | ICD-10-CM

## 2023-11-07 DIAGNOSIS — R20.2 NUMBNESS AND TINGLING OF RIGHT HAND: Primary | ICD-10-CM

## 2023-11-07 PROCEDURE — 99283 EMERGENCY DEPT VISIT LOW MDM: CPT

## 2023-11-07 PROCEDURE — 73110 X-RAY EXAM OF WRIST: CPT

## 2023-11-07 PROCEDURE — 99284 EMERGENCY DEPT VISIT MOD MDM: CPT | Performed by: EMERGENCY MEDICINE

## 2023-11-07 RX ORDER — PREDNISONE 10 MG/1
TABLET ORAL
Qty: 20 TABLET | Refills: 0 | Status: SHIPPED | OUTPATIENT
Start: 2023-11-08 | End: 2023-11-16

## 2023-11-07 RX ADMIN — PREDNISONE 50 MG: 20 TABLET ORAL at 16:15

## 2023-11-07 NOTE — ED PROVIDER NOTES
Pt Name: Yoav Taylor  MRN: [id number]  [address] 1980  Age/Sex: 41 y.o. male  Date of evaluation: 11/7/2023  PCP: Olvin Devlin, [address]    Chief Complaint   Patient presents with    Hand Pain     R hand/finger numbness after quickly pulling back with right hand          HPI and MDM    41 y.o. male presenting with right hand fourth and fifth digits and ulnar aspect numbness for the last week or so. Patient states he is a , and about 3 weeks ago he was opening the hoyt and it was about to close on his shirt and he pulled it back up and hurt his shoulder in the process. His shoulder is better now. However over the last week he is experiencing these numbness symptoms. 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 Denies any neck or shoulder or arm pain otherwise. No trauma. Cap refill less than 2 seconds, full range of motion, no motor weakness, patient has diminished sensation over the right hand fourth and fifth digits and ulnar aspect, numbness extends to the wrist and does not go up into the forearm or any further. ED Course as of 11/07/23 1635   Tue Nov 07, 2023   1622 XR wrist 3+ views RIGHT  Per my independent interpretation, no acute fracture or dislocation. Concern for neuropraxia at the level of the right wrist, could be due from from overuse injury/inflammation. Started on prednisone taper. Advised orthopedic follow-up, return precautions were discussed, patient verbalized understanding. Medications   predniSONE tablet 50 mg (50 mg Oral Given 11/7/23 1615)         Past Medical and Surgical History    No past medical history on file.     Past Surgical History:   Procedure Laterality Date    TONSILLECTOMY AND ADENOIDECTOMY         Family History   Problem Relation Age of Onset    Heart disease Father     Diabetes Father     Other Brother         history of non-Hodgkins lymphoma Social History     Tobacco Use    Smoking status: Never    Smokeless tobacco: Never   Vaping Use    Vaping Use: Never used   Substance Use Topics    Alcohol use: Not Currently    Drug use: Never           Allergies    No Known Allergies    Home Medications    Prior to Admission medications    Medication Sig Start Date End Date Taking? Authorizing Provider   benzonatate (TESSALON PERLES) 100 mg capsule Take 1 capsule (100 mg total) by mouth 3 (three) times a day as needed for cough 8/15/23   Aiden Chow PA-C   brompheniramine-pseudoephedrine-DM 30-2-10 MG/5ML syrup Take 10 mL by mouth daily at bedtime 8/15/23   Aiden Chow PA-C   fexofenadine (ALLEGRA) 180 MG tablet Take 180 mg by mouth daily    Historical Provider, MD           Physical Exam      ED Triage Vitals [11/07/23 1455]   Temperature Pulse Respirations Blood Pressure SpO2   98.9 °F (37.2 °C) 86 20 140/82 98 %      Temp Source Heart Rate Source Patient Position - Orthostatic VS BP Location FiO2 (%)   Temporal Monitor Sitting Right arm --      Pain Score       --               Physical Exam  Constitutional:       General: He is not in acute distress. HENT:      Nose: Nose normal.   Eyes:      Conjunctiva/sclera: Conjunctivae normal.   Cardiovascular:      Rate and Rhythm: Normal rate. Pulmonary:      Effort: Pulmonary effort is normal. No respiratory distress. Abdominal:      General: There is no distension. Musculoskeletal:         General: No swelling, tenderness or deformity. Normal range of motion. Cervical back: Normal range of motion and neck supple. No rigidity or tenderness. Skin:     General: Skin is warm. Findings: No erythema. Neurological:      Mental Status: He is alert and oriented to person, place, and time. Cranial Nerves: No cranial nerve deficit. Motor: No weakness.       Comments: Reduced sensation over the ulnar aspect of the right hand, fourth and fifth digits, no reduced sensation over the forearm or upper arm. Diagnostic Results      Labs:    Results Reviewed       None            All labs reviewed and utilized in the medical decision making process    Radiology:    XR wrist 3+ views RIGHT    (Results Pending)       All radiology studies independently viewed by me and interpreted by the radiologist.    Procedure    Procedures        FINAL IMPRESSION    Final diagnoses:   Numbness and tingling of right hand - ulnar aspect, 4th and 5th digits         DISPOSITION    Time reflects when diagnosis was documented in both MDM as applicable and the Disposition within this note       Time User Action Codes Description Comment    11/7/2023  4:23 PM Di Majors Add [R20.0,  R20.2] Numbness and tingling of right hand     11/7/2023  4:23 PM Di Majors Modify [R20.0,  R20.2] Numbness and tingling of right hand ulnar aspect, 4th and 5th digits          ED Disposition       ED Disposition   Discharge    Condition   Stable    Date/Time   Tue Nov 7, 2023  4:23 PM    [address] [facility] discharge to home/self care. Follow-up Information       Follow up With Specialties Details Why Contact Info Additional [address] [facility] Call in 1 day  [address] [facility]  [place] [address] [facility] [id number]  [address] [place] [address], [address] [facility], [address], [place], [address], [phone]              PATIENT REFERRED TO:    [address] [facility],[address] Floor [facility]  [address] [address] [phone]  Call in 1 day        DISCHARGE MEDICATIONS:    Patient's Medications   Discharge Prescriptions    PREDNISONE 10 MG TABLET    Take 4 tablets (40 mg total) by mouth daily for 2 days, THEN 3 tablets (30 mg total) daily for 2 days, THEN 2 tablets (20 mg total) daily for 2 days, THEN 1 tablet (10 mg total) daily for 2 days.  Do not start before November 8, 2023. Start Date: 11/8/2023 End Date: 11/16/2023       Order Dose: --       Quantity: 20 tablet    Refills: 0       No discharge procedures on file. Christine Perez DO        This note was partially completed using voice recognition technology, and was scanned for gross errors; however some errors may still exist. Please contact the author with any questions or requests for clarification.       Christine Perez DO  11/07/23 3649

## 2023-11-08 ENCOUNTER — VBI (OUTPATIENT)
Dept: FAMILY MEDICINE CLINIC | Facility: CLINIC | Age: 43
End: 2023-11-08

## 2023-11-08 NOTE — LETTER
[facility]  Brandon Mukund [place] [id number]    Date: 11/10/23  Asia Galeana  [address] [place] [address] [place] [address]    Dear Jarrell Hazel:                                                                                                                              Thank you for choosing [facility] emergency department for care. Your primary care provider wants to make sure that your ongoing medical care is being addressed. If you require follow up care as a result of your emergency department visit, there are a few things the practice would like you to know. As part of the network's continuing commitment to caring for our patients, we have added more same day appointments and have extended office hours to meet your medical needs. After hours, on-call physicians are available via your primary care provider's main office line. We encourage you to contact our office prior to seeking treatment to discuss your symptoms with the medical staff. Together, we can determine the correct course of action. A majority of non-emergent conditions such as: common cold, flu-like symptoms, fevers, strains/sprains, dislocations, minor burns, cuts and animal bites can be treated at [facility] facilities. Diagnostic testing is available at some sites. Of course, if you are experiencing a life threatening medical emergency call 911 or proceed directly to the nearest emergency room.     Your nearest [facility] facility is conveniently located at:    [address]  [address] [facility] [address] offered at most [facility] locations  [address] [place] [address] your spot online at [email or web address] or on the [address] [place] [address] Drive    Sincerely,    [address] [facility]  Dept: [phone]

## 2023-11-08 NOTE — TELEPHONE ENCOUNTER
11/08/23 12:12 PM    Patient contacted post ED visit, outreach attempt made but message could not be left. Additional outreach attempt will be made. Thank you.   Rosario Mojica MA  [facility]

## 2023-11-09 NOTE — TELEPHONE ENCOUNTER
11/09/23 3:17 PM    Patient contacted post ED visit, outreach attempt made but message could not be left. Additional outreach attempt will be made. Thank you.   Alessandro Talavera MA  [facility]

## 2023-11-10 NOTE — TELEPHONE ENCOUNTER
11/10/23 9:55 AM    Patient contacted post ED visit, phone outreaches were unsuccessful and a MyChart letter has been sent to the patient as follow-up. Thank you.   Mary Lin MA  [facility]